# Patient Record
Sex: MALE | Race: WHITE | NOT HISPANIC OR LATINO | URBAN - METROPOLITAN AREA
[De-identification: names, ages, dates, MRNs, and addresses within clinical notes are randomized per-mention and may not be internally consistent; named-entity substitution may affect disease eponyms.]

---

## 2023-02-02 ENCOUNTER — OFFICE (OUTPATIENT)
Dept: URBAN - METROPOLITAN AREA CLINIC 26 | Facility: CLINIC | Age: 58
End: 2023-02-02

## 2023-02-02 VITALS
TEMPERATURE: 97.8 F | HEIGHT: 70 IN | SYSTOLIC BLOOD PRESSURE: 161 MMHG | HEART RATE: 73 BPM | WEIGHT: 257 LBS | DIASTOLIC BLOOD PRESSURE: 88 MMHG

## 2023-02-02 DIAGNOSIS — R10.813 RIGHT LOWER QUADRANT ABDOMINAL TENDERNESS: ICD-10-CM

## 2023-02-02 DIAGNOSIS — R10.814 LEFT LOWER QUADRANT ABDOMINAL TENDERNESS: ICD-10-CM

## 2023-02-02 DIAGNOSIS — R10.30 LOWER ABDOMINAL PAIN, UNSPECIFIED: ICD-10-CM

## 2023-02-02 DIAGNOSIS — K59.09 OTHER CONSTIPATION: ICD-10-CM

## 2023-02-02 PROCEDURE — 99213 OFFICE O/P EST LOW 20 MIN: CPT

## 2023-03-15 ENCOUNTER — OFFICE (OUTPATIENT)
Dept: URBAN - METROPOLITAN AREA CLINIC 26 | Facility: CLINIC | Age: 58
End: 2023-03-15
Payer: COMMERCIAL

## 2023-03-15 VITALS — HEIGHT: 70 IN | WEIGHT: 260 LBS

## 2023-03-15 DIAGNOSIS — Z87.19 PERSONAL HISTORY OF OTHER DISEASES OF THE DIGESTIVE SYSTEM: ICD-10-CM

## 2023-03-15 DIAGNOSIS — K59.09 OTHER CONSTIPATION: ICD-10-CM

## 2023-03-15 DIAGNOSIS — R14.0 ABDOMINAL DISTENSION (GASEOUS): ICD-10-CM

## 2023-03-15 DIAGNOSIS — I86.4 GASTRIC VARICES: ICD-10-CM

## 2023-03-15 DIAGNOSIS — R10.30 LOWER ABDOMINAL PAIN, UNSPECIFIED: ICD-10-CM

## 2023-03-15 DIAGNOSIS — I82.890 ACUTE EMBOLISM AND THROMBOSIS OF OTHER SPECIFIED VEINS: ICD-10-CM

## 2023-03-15 PROCEDURE — 99214 OFFICE O/P EST MOD 30 MIN: CPT | Performed by: CLINICAL NURSE SPECIALIST

## 2023-05-08 ENCOUNTER — AMBULATORY SURGICAL CENTER (OUTPATIENT)
Dept: URBAN - METROPOLITAN AREA SURGERY 12 | Facility: SURGERY | Age: 58
End: 2023-05-08
Payer: COMMERCIAL

## 2023-05-08 ENCOUNTER — OFFICE (OUTPATIENT)
Dept: URBAN - METROPOLITAN AREA PATHOLOGY 2 | Facility: PATHOLOGY | Age: 58
End: 2023-05-08
Payer: COMMERCIAL

## 2023-05-08 VITALS
SYSTOLIC BLOOD PRESSURE: 128 MMHG | DIASTOLIC BLOOD PRESSURE: 76 MMHG | SYSTOLIC BLOOD PRESSURE: 138 MMHG | RESPIRATION RATE: 10 BRPM | SYSTOLIC BLOOD PRESSURE: 138 MMHG | RESPIRATION RATE: 6 BRPM | OXYGEN SATURATION: 97 % | RESPIRATION RATE: 9 BRPM | DIASTOLIC BLOOD PRESSURE: 84 MMHG | RESPIRATION RATE: 7 BRPM | HEIGHT: 70 IN | HEART RATE: 64 BPM | RESPIRATION RATE: 10 BRPM | DIASTOLIC BLOOD PRESSURE: 76 MMHG | WEIGHT: 237 LBS | DIASTOLIC BLOOD PRESSURE: 83 MMHG | RESPIRATION RATE: 9 BRPM | SYSTOLIC BLOOD PRESSURE: 183 MMHG | HEART RATE: 67 BPM | RESPIRATION RATE: 9 BRPM | SYSTOLIC BLOOD PRESSURE: 124 MMHG | HEART RATE: 62 BPM | DIASTOLIC BLOOD PRESSURE: 81 MMHG | RESPIRATION RATE: 15 BRPM | SYSTOLIC BLOOD PRESSURE: 139 MMHG | HEART RATE: 70 BPM | RESPIRATION RATE: 12 BRPM | RESPIRATION RATE: 12 BRPM | DIASTOLIC BLOOD PRESSURE: 82 MMHG | RESPIRATION RATE: 12 BRPM | DIASTOLIC BLOOD PRESSURE: 84 MMHG | RESPIRATION RATE: 11 BRPM | OXYGEN SATURATION: 98 % | RESPIRATION RATE: 7 BRPM | SYSTOLIC BLOOD PRESSURE: 179 MMHG | HEIGHT: 70 IN | RESPIRATION RATE: 13 BRPM | RESPIRATION RATE: 6 BRPM | HEART RATE: 70 BPM | SYSTOLIC BLOOD PRESSURE: 179 MMHG | OXYGEN SATURATION: 94 % | DIASTOLIC BLOOD PRESSURE: 81 MMHG | HEART RATE: 68 BPM | DIASTOLIC BLOOD PRESSURE: 105 MMHG | HEART RATE: 64 BPM | OXYGEN SATURATION: 99 % | SYSTOLIC BLOOD PRESSURE: 179 MMHG | HEART RATE: 62 BPM | HEART RATE: 62 BPM | SYSTOLIC BLOOD PRESSURE: 138 MMHG | DIASTOLIC BLOOD PRESSURE: 95 MMHG | DIASTOLIC BLOOD PRESSURE: 83 MMHG | HEART RATE: 65 BPM | DIASTOLIC BLOOD PRESSURE: 95 MMHG | RESPIRATION RATE: 14 BRPM | OXYGEN SATURATION: 98 % | HEIGHT: 70 IN | HEART RATE: 63 BPM | DIASTOLIC BLOOD PRESSURE: 105 MMHG | SYSTOLIC BLOOD PRESSURE: 128 MMHG | DIASTOLIC BLOOD PRESSURE: 88 MMHG | OXYGEN SATURATION: 94 % | DIASTOLIC BLOOD PRESSURE: 83 MMHG | SYSTOLIC BLOOD PRESSURE: 145 MMHG | HEART RATE: 67 BPM | OXYGEN SATURATION: 99 % | HEART RATE: 68 BPM | SYSTOLIC BLOOD PRESSURE: 145 MMHG | RESPIRATION RATE: 15 BRPM | SYSTOLIC BLOOD PRESSURE: 136 MMHG | OXYGEN SATURATION: 100 % | HEART RATE: 59 BPM | WEIGHT: 237 LBS | RESPIRATION RATE: 6 BRPM | RESPIRATION RATE: 7 BRPM | HEART RATE: 65 BPM | OXYGEN SATURATION: 99 % | SYSTOLIC BLOOD PRESSURE: 128 MMHG | DIASTOLIC BLOOD PRESSURE: 76 MMHG | SYSTOLIC BLOOD PRESSURE: 136 MMHG | HEART RATE: 59 BPM | RESPIRATION RATE: 10 BRPM | HEART RATE: 66 BPM | OXYGEN SATURATION: 94 % | OXYGEN SATURATION: 97 % | HEART RATE: 68 BPM | HEART RATE: 65 BPM | SYSTOLIC BLOOD PRESSURE: 183 MMHG | DIASTOLIC BLOOD PRESSURE: 81 MMHG | HEART RATE: 63 BPM | OXYGEN SATURATION: 100 % | RESPIRATION RATE: 11 BRPM | SYSTOLIC BLOOD PRESSURE: 124 MMHG | HEART RATE: 66 BPM | OXYGEN SATURATION: 100 % | DIASTOLIC BLOOD PRESSURE: 88 MMHG | HEART RATE: 63 BPM | OXYGEN SATURATION: 97 % | HEART RATE: 64 BPM | RESPIRATION RATE: 14 BRPM | DIASTOLIC BLOOD PRESSURE: 82 MMHG | DIASTOLIC BLOOD PRESSURE: 88 MMHG | HEART RATE: 70 BPM | HEART RATE: 66 BPM | RESPIRATION RATE: 15 BRPM | WEIGHT: 237 LBS | SYSTOLIC BLOOD PRESSURE: 139 MMHG | SYSTOLIC BLOOD PRESSURE: 145 MMHG | RESPIRATION RATE: 11 BRPM | SYSTOLIC BLOOD PRESSURE: 139 MMHG | DIASTOLIC BLOOD PRESSURE: 82 MMHG | RESPIRATION RATE: 13 BRPM | SYSTOLIC BLOOD PRESSURE: 124 MMHG | HEART RATE: 67 BPM | DIASTOLIC BLOOD PRESSURE: 105 MMHG | HEART RATE: 59 BPM | DIASTOLIC BLOOD PRESSURE: 95 MMHG | RESPIRATION RATE: 14 BRPM | DIASTOLIC BLOOD PRESSURE: 84 MMHG | OXYGEN SATURATION: 98 % | SYSTOLIC BLOOD PRESSURE: 183 MMHG | RESPIRATION RATE: 13 BRPM | SYSTOLIC BLOOD PRESSURE: 136 MMHG

## 2023-05-08 DIAGNOSIS — K22.70 BARRETT'S ESOPHAGUS WITHOUT DYSPLASIA: ICD-10-CM

## 2023-05-08 DIAGNOSIS — T18.2XXA FOREIGN BODY IN STOMACH, INITIAL ENCOUNTER: ICD-10-CM

## 2023-05-08 DIAGNOSIS — I86.4 GASTRIC VARICES: ICD-10-CM

## 2023-05-08 DIAGNOSIS — K44.9 DIAPHRAGMATIC HERNIA WITHOUT OBSTRUCTION OR GANGRENE: ICD-10-CM

## 2023-05-08 PROBLEM — K31.89 OTHER DISEASES OF STOMACH AND DUODENUM: Status: ACTIVE | Noted: 2023-05-08

## 2023-05-08 PROBLEM — R10.84 GENERALIZED ABDOMINAL PAIN: Status: ACTIVE | Noted: 2023-05-08

## 2023-05-08 PROCEDURE — 88342 IMHCHEM/IMCYTCHM 1ST ANTB: CPT | Performed by: PATHOLOGY

## 2023-05-08 PROCEDURE — 43239 EGD BIOPSY SINGLE/MULTIPLE: CPT | Performed by: INTERNAL MEDICINE

## 2023-05-08 PROCEDURE — 88305 TISSUE EXAM BY PATHOLOGIST: CPT | Performed by: PATHOLOGY

## 2023-05-08 PROCEDURE — 88313 SPECIAL STAINS GROUP 2: CPT | Performed by: PATHOLOGY

## 2023-05-08 RX ORDER — SENNOSIDES 8.6 MG/1
TABLET ORAL
Qty: 360 | Refills: 3 | Status: ACTIVE

## 2023-08-25 ENCOUNTER — OFFICE (OUTPATIENT)
Dept: URBAN - METROPOLITAN AREA CLINIC 26 | Facility: CLINIC | Age: 58
End: 2023-08-25
Payer: COMMERCIAL

## 2023-08-25 VITALS — WEIGHT: 222 LBS | HEIGHT: 70 IN | TEMPERATURE: 98.3 F

## 2023-08-25 DIAGNOSIS — Z86.010 PERSONAL HISTORY OF COLONIC POLYPS: ICD-10-CM

## 2023-08-25 DIAGNOSIS — K59.09 OTHER CONSTIPATION: ICD-10-CM

## 2023-08-25 DIAGNOSIS — I86.4 GASTRIC VARICES: ICD-10-CM

## 2023-08-25 DIAGNOSIS — K21.9 GASTRO-ESOPHAGEAL REFLUX DISEASE WITHOUT ESOPHAGITIS: ICD-10-CM

## 2023-08-25 DIAGNOSIS — K22.70 BARRETT'S ESOPHAGUS WITHOUT DYSPLASIA: ICD-10-CM

## 2023-08-25 PROCEDURE — 99214 OFFICE O/P EST MOD 30 MIN: CPT | Performed by: INTERNAL MEDICINE

## 2023-09-29 LAB
ALANINE AMINOTRANSFERASE (SGPT) (U/L) IN SER/PLAS: 30 U/L (ref 10–52)
ALBUMIN (G/DL) IN SER/PLAS: 4.8 G/DL (ref 3.4–5)
ALKALINE PHOSPHATASE (U/L) IN SER/PLAS: 59 U/L (ref 33–120)
ANION GAP IN SER/PLAS: 14 MMOL/L (ref 10–20)
ASPARTATE AMINOTRANSFERASE (SGOT) (U/L) IN SER/PLAS: 31 U/L (ref 9–39)
BASOPHILS (10*3/UL) IN BLOOD BY AUTOMATED COUNT: 0.02 X10E9/L (ref 0–0.1)
BASOPHILS/100 LEUKOCYTES IN BLOOD BY AUTOMATED COUNT: 0.3 % (ref 0–2)
BILIRUBIN TOTAL (MG/DL) IN SER/PLAS: 0.5 MG/DL (ref 0–1.2)
C REACTIVE PROTEIN (MG/L) IN SER/PLAS: 0.34 MG/DL
CALCIUM (MG/DL) IN SER/PLAS: 9.3 MG/DL (ref 8.6–10.6)
CARBON DIOXIDE, TOTAL (MMOL/L) IN SER/PLAS: 28 MMOL/L (ref 21–32)
CHLORIDE (MMOL/L) IN SER/PLAS: 109 MMOL/L (ref 98–107)
CREATININE (MG/DL) IN SER/PLAS: 1.23 MG/DL (ref 0.5–1.3)
EOSINOPHILS (10*3/UL) IN BLOOD BY AUTOMATED COUNT: 0.18 X10E9/L (ref 0–0.7)
EOSINOPHILS/100 LEUKOCYTES IN BLOOD BY AUTOMATED COUNT: 3 % (ref 0–6)
ERYTHROCYTE DISTRIBUTION WIDTH (RATIO) BY AUTOMATED COUNT: 13.1 % (ref 11.5–14.5)
ERYTHROCYTE MEAN CORPUSCULAR HEMOGLOBIN CONCENTRATION (G/DL) BY AUTOMATED: 31.5 G/DL (ref 32–36)
ERYTHROCYTE MEAN CORPUSCULAR VOLUME (FL) BY AUTOMATED COUNT: 86 FL (ref 80–100)
ERYTHROCYTES (10*6/UL) IN BLOOD BY AUTOMATED COUNT: 4.96 X10E12/L (ref 4.5–5.9)
GFR MALE: 67 ML/MIN/1.73M2
GLUCOSE (MG/DL) IN SER/PLAS: 59 MG/DL (ref 74–99)
HEMATOCRIT (%) IN BLOOD BY AUTOMATED COUNT: 42.8 % (ref 41–52)
HEMOGLOBIN (G/DL) IN BLOOD: 13.5 G/DL (ref 13.5–17.5)
IMMATURE GRANULOCYTES/100 LEUKOCYTES IN BLOOD BY AUTOMATED COUNT: 0.2 % (ref 0–0.9)
LEUKOCYTES (10*3/UL) IN BLOOD BY AUTOMATED COUNT: 5.9 X10E9/L (ref 4.4–11.3)
LYMPHOCYTES (10*3/UL) IN BLOOD BY AUTOMATED COUNT: 1.42 X10E9/L (ref 1.2–4.8)
LYMPHOCYTES/100 LEUKOCYTES IN BLOOD BY AUTOMATED COUNT: 24 % (ref 13–44)
MONOCYTES (10*3/UL) IN BLOOD BY AUTOMATED COUNT: 0.47 X10E9/L (ref 0.1–1)
MONOCYTES/100 LEUKOCYTES IN BLOOD BY AUTOMATED COUNT: 7.9 % (ref 2–10)
NEUTROPHILS (10*3/UL) IN BLOOD BY AUTOMATED COUNT: 3.82 X10E9/L (ref 1.2–7.7)
NEUTROPHILS/100 LEUKOCYTES IN BLOOD BY AUTOMATED COUNT: 64.6 % (ref 40–80)
NRBC (PER 100 WBCS) BY AUTOMATED COUNT: 0 /100 WBC (ref 0–0)
PLATELETS (10*3/UL) IN BLOOD AUTOMATED COUNT: 221 X10E9/L (ref 150–450)
POTASSIUM (MMOL/L) IN SER/PLAS: 3.9 MMOL/L (ref 3.5–5.3)
PROTEIN TOTAL: 6.8 G/DL (ref 6.4–8.2)
SEDIMENTATION RATE, ERYTHROCYTE: 3 MM/H (ref 0–20)
SODIUM (MMOL/L) IN SER/PLAS: 147 MMOL/L (ref 136–145)
UREA NITROGEN (MG/DL) IN SER/PLAS: 21 MG/DL (ref 6–23)

## 2023-11-27 DIAGNOSIS — E55.9 VITAMIN D DEFICIENCY: Primary | ICD-10-CM

## 2023-11-27 PROBLEM — E78.2 HYPERLIPIDEMIA, MIXED: Status: ACTIVE | Noted: 2023-11-27

## 2023-11-27 PROBLEM — M15.9 GENERALIZED OSTEOARTHRITIS: Status: ACTIVE | Noted: 2023-11-27

## 2023-11-27 PROBLEM — E03.9 HYPOTHYROIDISM: Status: ACTIVE | Noted: 2023-11-27

## 2023-11-27 PROBLEM — Z96.41 INSULIN PUMP IN PLACE: Status: ACTIVE | Noted: 2023-11-27

## 2023-11-27 PROBLEM — M79.2 NEUROPATHIC PAIN: Status: ACTIVE | Noted: 2023-11-27

## 2023-11-27 PROBLEM — M19.042 LOCALIZED OSTEOARTHRITIS OF BOTH HANDS: Status: ACTIVE | Noted: 2023-11-27

## 2023-11-27 PROBLEM — M19.041 LOCALIZED OSTEOARTHRITIS OF BOTH HANDS: Status: ACTIVE | Noted: 2023-11-27

## 2023-11-27 PROBLEM — U09.9 POST-COVID-19 CONDITION: Status: ACTIVE | Noted: 2023-11-27

## 2023-11-27 PROBLEM — E10.9 TYPE 1 DIABETES MELLITUS (MULTI): Status: ACTIVE | Noted: 2023-11-27

## 2023-11-27 PROBLEM — R26.89 BALANCE PROBLEM: Status: ACTIVE | Noted: 2023-11-27

## 2023-11-27 PROBLEM — R41.89 BRAIN FOG: Status: ACTIVE | Noted: 2023-11-27

## 2023-11-27 PROBLEM — I10 BENIGN ESSENTIAL HYPERTENSION: Status: ACTIVE | Noted: 2023-11-27

## 2023-11-27 PROBLEM — M72.0 DUPUYTREN'S CONTRACTURE: Status: ACTIVE | Noted: 2023-11-27

## 2023-11-27 PROBLEM — I38 HEART VALVE DISEASE: Status: ACTIVE | Noted: 2023-11-27

## 2023-11-27 RX ORDER — MELOXICAM 15 MG/1
1 TABLET ORAL DAILY PRN
COMMUNITY
Start: 2022-09-28 | End: 2024-03-06 | Stop reason: SDUPTHER

## 2023-11-27 RX ORDER — ERGOCALCIFEROL 1.25 MG/1
1 CAPSULE ORAL
Qty: 12 CAPSULE | Refills: 3 | Status: SHIPPED | OUTPATIENT
Start: 2023-11-27 | End: 2024-11-26

## 2023-11-27 RX ORDER — ATORVASTATIN CALCIUM 80 MG/1
1 TABLET, FILM COATED ORAL NIGHTLY
COMMUNITY
Start: 2022-03-30

## 2023-11-27 RX ORDER — ALBUTEROL SULFATE 90 UG/1
AEROSOL, METERED RESPIRATORY (INHALATION)
COMMUNITY
Start: 2022-03-30

## 2023-11-27 RX ORDER — OMEPRAZOLE 20 MG/1
TABLET, ORALLY DISINTEGRATING, DELAYED RELEASE ORAL
COMMUNITY
Start: 2022-03-30 | End: 2024-03-06 | Stop reason: SDUPTHER

## 2023-11-27 RX ORDER — FENOFIBRATE 160 MG/1
1 TABLET ORAL DAILY
COMMUNITY
Start: 2022-03-30 | End: 2024-03-06 | Stop reason: SDUPTHER

## 2023-11-27 RX ORDER — INSULIN GLARGINE 100 [IU]/ML
INJECTION, SOLUTION SUBCUTANEOUS
COMMUNITY
Start: 2022-03-30 | End: 2024-03-06 | Stop reason: SDUPTHER

## 2023-11-27 RX ORDER — GUAIFENESIN 600 MG/1
600 TABLET, EXTENDED RELEASE ORAL 2 TIMES DAILY
COMMUNITY

## 2023-11-27 RX ORDER — ERGOCALCIFEROL 1.25 MG/1
1 CAPSULE ORAL
COMMUNITY
Start: 2020-05-14 | End: 2023-11-27 | Stop reason: SDUPTHER

## 2023-11-27 RX ORDER — LISINOPRIL 10 MG/1
1 TABLET ORAL DAILY
COMMUNITY
Start: 2021-02-05 | End: 2024-03-06 | Stop reason: SDUPTHER

## 2024-03-06 ENCOUNTER — TELEPHONE (OUTPATIENT)
Dept: PRIMARY CARE | Facility: CLINIC | Age: 59
End: 2024-03-06
Payer: MEDICAID

## 2024-03-06 DIAGNOSIS — M15.9 GENERALIZED OSTEOARTHRITIS: Primary | ICD-10-CM

## 2024-03-06 RX ORDER — LOSARTAN POTASSIUM 50 MG/1
50 TABLET ORAL DAILY
COMMUNITY
Start: 2024-02-21

## 2024-03-06 RX ORDER — OMEPRAZOLE 40 MG/1
40 CAPSULE, DELAYED RELEASE ORAL DAILY
COMMUNITY
Start: 2023-12-19

## 2024-03-06 RX ORDER — INSULIN GLARGINE 100 [IU]/ML
30 INJECTION, SOLUTION SUBCUTANEOUS 2 TIMES DAILY
COMMUNITY
Start: 2024-02-12

## 2024-03-06 RX ORDER — LISINOPRIL 20 MG/1
20 TABLET ORAL DAILY
COMMUNITY
Start: 2024-02-12

## 2024-03-06 RX ORDER — OMEGA-3 FATTY ACIDS 1000 MG
1000 CAPSULE ORAL 2 TIMES DAILY
COMMUNITY
Start: 2024-02-12

## 2024-03-06 RX ORDER — NABUMETONE 750 MG/1
750 TABLET, FILM COATED ORAL 2 TIMES DAILY PRN
COMMUNITY
Start: 2024-02-01 | End: 2024-03-06 | Stop reason: SDUPTHER

## 2024-03-06 RX ORDER — TIZANIDINE 4 MG/1
4 TABLET ORAL EVERY 8 HOURS PRN
COMMUNITY
Start: 2024-02-01

## 2024-03-06 RX ORDER — NABUMETONE 750 MG/1
750 TABLET, FILM COATED ORAL 2 TIMES DAILY PRN
Qty: 180 TABLET | Refills: 3 | Status: SHIPPED | OUTPATIENT
Start: 2024-03-06 | End: 2024-03-29 | Stop reason: SDUPTHER

## 2024-03-06 RX ORDER — AMITRIPTYLINE HYDROCHLORIDE 10 MG/1
10 TABLET, FILM COATED ORAL 2 TIMES DAILY
COMMUNITY
Start: 2024-02-12

## 2024-03-06 RX ORDER — INSULIN LISPRO 100 [IU]/ML
22 INJECTION, SOLUTION INTRAVENOUS; SUBCUTANEOUS
COMMUNITY
Start: 2024-02-12

## 2024-03-06 RX ORDER — DULAGLUTIDE 0.75 MG/.5ML
0.75 INJECTION, SOLUTION SUBCUTANEOUS
COMMUNITY
Start: 2024-02-21

## 2024-03-06 RX ORDER — GEMFIBROZIL 600 MG/1
600 TABLET, FILM COATED ORAL 2 TIMES DAILY
COMMUNITY
Start: 2024-02-12

## 2024-03-06 RX ORDER — GABAPENTIN 300 MG/1
300 CAPSULE ORAL NIGHTLY
COMMUNITY
Start: 2024-02-13

## 2024-03-06 NOTE — TELEPHONE ENCOUNTER
Pt called for a refill on nabumetona 750mg       [id number] - [place], [place] - [address]  [address] [facility] [address]  Phone: [phone] Fax: [phone]

## 2024-03-29 ENCOUNTER — OFFICE VISIT (OUTPATIENT)
Dept: RHEUMATOLOGY | Facility: CLINIC | Age: 59
End: 2024-03-29
Payer: MEDICAID

## 2024-03-29 VITALS
HEIGHT: 70 IN | OXYGEN SATURATION: 96 % | DIASTOLIC BLOOD PRESSURE: 94 MMHG | HEART RATE: 70 BPM | TEMPERATURE: 97.5 F | WEIGHT: 224.4 LBS | SYSTOLIC BLOOD PRESSURE: 150 MMHG | BODY MASS INDEX: 32.13 KG/M2

## 2024-03-29 DIAGNOSIS — M72.0 DUPUYTREN'S CONTRACTURE: ICD-10-CM

## 2024-03-29 DIAGNOSIS — M15.9 GENERALIZED OSTEOARTHRITIS: Primary | ICD-10-CM

## 2024-03-29 DIAGNOSIS — M51.26 HERNIATED INTERVERTEBRAL DISC OF LUMBAR SPINE: ICD-10-CM

## 2024-03-29 PROBLEM — U09.9 POST-COVID-19 CONDITION: Status: RESOLVED | Noted: 2023-11-27 | Resolved: 2024-03-29

## 2024-03-29 PROCEDURE — 3077F SYST BP >= 140 MM HG: CPT | Performed by: INTERNAL MEDICINE

## 2024-03-29 PROCEDURE — 4010F ACE/ARB THERAPY RXD/TAKEN: CPT | Performed by: INTERNAL MEDICINE

## 2024-03-29 PROCEDURE — 1036F TOBACCO NON-USER: CPT | Performed by: INTERNAL MEDICINE

## 2024-03-29 PROCEDURE — 3080F DIAST BP >= 90 MM HG: CPT | Performed by: INTERNAL MEDICINE

## 2024-03-29 PROCEDURE — 99214 OFFICE O/P EST MOD 30 MIN: CPT | Performed by: INTERNAL MEDICINE

## 2024-03-29 RX ORDER — SENNOSIDES 8.6 MG/1
TABLET ORAL
COMMUNITY
Start: 2023-05-08

## 2024-03-29 RX ORDER — PEN NEEDLE, DIABETIC 29 G X1/2"
NEEDLE, DISPOSABLE MISCELLANEOUS
COMMUNITY
Start: 2024-03-12

## 2024-03-29 RX ORDER — DULAGLUTIDE 3 MG/.5ML
3 INJECTION, SOLUTION SUBCUTANEOUS
COMMUNITY
Start: 2024-03-15

## 2024-03-29 RX ORDER — MULTIVIT-MIN/IRON/FOLIC ACID/K 18-600-40
500 CAPSULE ORAL
COMMUNITY

## 2024-03-29 RX ORDER — NABUMETONE 750 MG/1
750 TABLET, FILM COATED ORAL 2 TIMES DAILY PRN
Qty: 180 TABLET | Refills: 3 | Status: SHIPPED | OUTPATIENT
Start: 2024-03-29 | End: 2025-03-29

## 2024-03-29 RX ORDER — BLOOD-GLUCOSE SENSOR
EACH MISCELLANEOUS
COMMUNITY
Start: 2024-03-12

## 2024-03-29 ASSESSMENT — ENCOUNTER SYMPTOMS
JOINT SWELLING: 0
ARTHRALGIAS: 1
SHORTNESS OF BREATH: 0
MYALGIAS: 1
WEAKNESS: 0
FEVER: 0
NUMBNESS: 1
BACK PAIN: 1
COLOR CHANGE: 0
COUGH: 0
FATIGUE: 0

## 2024-03-29 ASSESSMENT — PATIENT HEALTH QUESTIONNAIRE - PHQ9
1. LITTLE INTEREST OR PLEASURE IN DOING THINGS: NOT AT ALL
SUM OF ALL RESPONSES TO PHQ9 QUESTIONS 1 AND 2: 0
2. FEELING DOWN, DEPRESSED OR HOPELESS: NOT AT ALL

## 2024-03-29 NOTE — PATIENT INSTRUCTIONS
It was a pleasure to see you today  Please call if your symptoms worsen  Please review your summary for education and reminders.  Follow up at your next appointment.    If you had labs/xrays done today, you will be able to view on [facility].   We will contact you when the results are reviewed for further discussion.  Please note that you may receive your results before I have had a chance to review.  Please know I will be contacting you for discussion  Homegoing instructions for all patient  A healthy lifestyle helps chronic diseases  These are all the goals you should strive to improve your overall health   Blood pressure <130/85   BMI of <30 or waist circumference that is 1/2 of your height   Fasting blood sugar <107 (if you are diabetic, aim for an A1c <6.4%_   LDL cholesterol <130   Avoid smoking   Manage your stress   Get your preventive exams   Get your immunizations

## 2024-03-29 NOTE — LETTER
March 29, 2024     Salma Rosa, APRN-CNP  [address] [place] [address]    Patient: Nagi Peña   YOB: 1965   Date of Visit: 3/29/2024       Dear Dr. Salma Rosa, APRN-CNP:    Thank you for referring Nagi Peña to me for evaluation. Below are my notes for this consultation.  If you have questions, please do not hesitate to call me. I look forward to following your patient along with you.       Sincerely,     Odalys Ren MD      CC: No Recipients  ______________________________________________________________________________________    RHEUMATOLOGY/PRIMARY CARE PROGRESS NOTE  Nagi Peañ 59 y.o. male  Chief Complaint   Patient presents with   • Osteoarthritis       SUBJECTIVE  Re:  arthritis  Pt reports relafen is helping somewhat.   (Pharmacy only dispensing 20tab at a time--I prescribed 90 day supply).  Since last seen, Had Dupuytren's contracture release in L hand and planned for R hand as well  Continues to have a lot of back pain.  Just had L4 injection that didn't help.   Thinks he may need more surgery      Patient Active Problem List    Diagnosis Date Noted   • Balance problem 11/27/2023   • Benign essential hypertension 11/27/2023   • Brain fog 11/27/2023   • Dupuytren's contracture 11/27/2023   • Generalized osteoarthritis 11/27/2023   • Heart valve disease 11/27/2023   • Hyperlipidemia, mixed 11/27/2023   • Hypothyroidism 11/27/2023   • Insulin pump in place 11/27/2023   • Localized osteoarthritis of both hands 11/27/2023   • Neuropathic pain 11/27/2023   • Type 1 diabetes mellitus (CMS/HCC) 11/27/2023   • Vitamin D deficiency 11/27/2023     Past Medical History:   Diagnosis Date   • Caffeine use    • Carpal tunnel syndrome     History of carpal tunnel syndrome   • History of colonic diverticulitis     History of diverticulitis of colon   • Personal history of pneumonia (recurrent)     History of pneumonia   • Post-COVID-19 condition 11/27/2023   • SBO (small  "bowel obstruction) (CMS/[facility])     History of small bowel obstruction     Current Outpatient Medications   Medication Instructions   • albuterol 90 mcg/actuation inhaler inhalation   • amitriptyline (ELAVIL) 10 mg, oral, 2 times daily   • ascorbic acid (vitamin C) 500 mg, oral, Daily RT   • atorvastatin (Lipitor) 80 mg tablet 1 tablet, oral, Nightly   • BD Insulin Syringe Ultra-Fine 0.5 mL 31 gauge x 5/16\" syringe use 1 syringe 5 times a day.   • Dexcom G7 Sensor device use as directed   • ergocalciferol (VITAMIN D-2) 1,250 mcg, oral, Weekly   • gabapentin (NEURONTIN) 300 mg, oral, Nightly   • gemfibrozil (LOPID) 600 mg, oral, 2 times daily   • glucagon (GLUCAGEN) 1 mg, intravenous   • guaiFENesin (MUCINEX) 600 mg, oral, 2 times daily   • HumaLOG U-100 Insulin 22 Units, subcutaneous, 3 times daily with meals   • Lantus U-100 Insulin 30 Units, subcutaneous, 2 times daily   • lisinopril 20 mg, oral, Daily   • losartan (COZAAR) 50 mg, oral, Daily   • nabumetone (RELAFEN) 750 mg, oral, 2 times daily PRN   • omega-3 1,000 mg, oral, 2 times daily   • omeprazole (PRILOSEC) 40 mg, oral, Daily   • sennosides (Senokot) 8.6 mg tablet TAKE up to FOUR TABLETS BY MOUTH EVERY EVENING AS DIRECTED   • tiZANidine (ZANAFLEX) 4 mg, oral, Every 8 hours PRN   • Trulicity 0.75 mg, subcutaneous, Weekly   • Trulicity 3 mg, subcutaneous, Weekly     Allergies   Allergen Reactions   • Carmelo-1 Other     Passed out per pt-states this happened in ER with stitches and the use of NOVOCAINE   Patient states passed out, states okay with lidocaine and xylocaine   • Doxycycline Swelling     Skin sensitivity     Skin sensitivity   • Procaine Unknown     Review of Systems   Constitutional:  Negative for fatigue and fever.   Respiratory:  Negative for cough and shortness of breath.    Cardiovascular:  Negative for leg swelling.   Musculoskeletal:  Positive for arthralgias, back pain and myalgias. Negative for gait problem and joint swelling.   Skin:  " "Negative for color change and rash.   Neurological:  Positive for numbness. Negative for weakness.       PHYSICAL EXAM  BP (!) 150/94   Pulse 70   Temp 36.4 °C (97.5 °F)   Ht 1.778 m (5' 10\")   Wt 102 kg (224 lb 6.4 oz)   SpO2 96%   BMI 32.20 kg/m²   Physical Exam  Vitals reviewed.   Constitutional:       General: He is not in acute distress.     Appearance: Normal appearance.   HENT:      Head: Normocephalic and atraumatic.   Eyes:      Extraocular Movements: Extraocular movements intact.      Conjunctiva/sclera: Conjunctivae normal.      Pupils: Pupils are equal, round, and reactive to light.   Pulmonary:      Effort: Pulmonary effort is normal. No respiratory distress.   Musculoskeletal:         General: No swelling, tenderness or deformity. Normal range of motion.      Cervical back: Normal range of motion and neck supple.      Right lower leg: No edema.      Left lower leg: No edema.      Comments: Healing surgery L palm  R palm with Dupuytren's contracture isolated to thumb/index  No synovitis noted on exam   Skin:     Findings: No bruising or rash.   Neurological:      General: No focal deficit present.      Mental Status: He is alert and oriented to person, place, and time. Mental status is at baseline.      Gait: Gait normal.   Psychiatric:         Mood and Affect: Mood normal.         Assessment/plan  Problem List Items Addressed This Visit       Dupuytren's contracture    Current Assessment & Plan     S/p surgery in February         Generalized osteoarthritis - Primary     Follow up: _____months      "

## 2024-03-29 NOTE — PROGRESS NOTES
"RHEUMATOLOGY/PRIMARY CARE PROGRESS NOTE  Nagi Peña 59 y.o. male  Chief Complaint   Patient presents with    Osteoarthritis       SUBJECTIVE  Re:  arthritis  Pt reports relafen is helping somewhat.   (Pharmacy only dispensing 20tab at a time--I prescribed 90 day supply).  Since last seen, Had Dupuytren's contracture release in L hand and planned for R hand as well  Continues to have a lot of back pain.  Just had L4 injection that didn't help.   Thinks he may need more surgery      Patient Active Problem List    Diagnosis Date Noted    Balance problem 11/27/2023    Benign essential hypertension 11/27/2023    Brain fog 11/27/2023    Dupuytren's contracture 11/27/2023    Generalized osteoarthritis 11/27/2023    Heart valve disease 11/27/2023    Hyperlipidemia, mixed 11/27/2023    Hypothyroidism 11/27/2023    Insulin pump in place 11/27/2023    Localized osteoarthritis of both hands 11/27/2023    Neuropathic pain 11/27/2023    Type 1 diabetes mellitus (CMS/HCC) 11/27/2023    Vitamin D deficiency 11/27/2023     Past Medical History:   Diagnosis Date    Caffeine use     Carpal tunnel syndrome     History of carpal tunnel syndrome    History of colonic diverticulitis     History of diverticulitis of colon    Personal history of pneumonia (recurrent)     History of pneumonia    Post-COVID-19 condition 11/27/2023    SBO (small bowel obstruction) (CMS/HCC)     History of small bowel obstruction     Current Outpatient Medications   Medication Instructions    albuterol 90 mcg/actuation inhaler inhalation    amitriptyline (ELAVIL) 10 mg, oral, 2 times daily    ascorbic acid (vitamin C) 500 mg, oral, Daily RT    atorvastatin (Lipitor) 80 mg tablet 1 tablet, oral, Nightly    BD Insulin Syringe Ultra-Fine 0.5 mL 31 gauge x 5/16\" syringe use 1 syringe 5 times a day.    Dexcom G7 Sensor device use as directed    ergocalciferol (VITAMIN D-2) 1,250 mcg, oral, Weekly    gabapentin (NEURONTIN) 300 mg, oral, Nightly    gemfibrozil " "(LOPID) 600 mg, oral, 2 times daily    glucagon (GLUCAGEN) 1 mg, intravenous    guaiFENesin (MUCINEX) 600 mg, oral, 2 times daily    HumaLOG U-100 Insulin 22 Units, subcutaneous, 3 times daily with meals    Lantus U-100 Insulin 30 Units, subcutaneous, 2 times daily    lisinopril 20 mg, oral, Daily    losartan (COZAAR) 50 mg, oral, Daily    nabumetone (RELAFEN) 750 mg, oral, 2 times daily PRN    omega-3 1,000 mg, oral, 2 times daily    omeprazole (PRILOSEC) 40 mg, oral, Daily    sennosides (Senokot) 8.6 mg tablet TAKE up to FOUR TABLETS BY MOUTH EVERY EVENING AS DIRECTED    tiZANidine (ZANAFLEX) 4 mg, oral, Every 8 hours PRN    Trulicity 0.75 mg, subcutaneous, Weekly    Trulicity 3 mg, subcutaneous, Weekly     Allergies   Allergen Reactions    Carmelo-1 Other     Passed out per pt-states this happened in ER with stitches and the use of NOVOCAINE   Patient states passed out, states okay with lidocaine and xylocaine    Doxycycline Swelling     Skin sensitivity     Skin sensitivity    Procaine Unknown     Review of Systems   Constitutional:  Negative for fatigue and fever.   Respiratory:  Negative for cough and shortness of breath.    Cardiovascular:  Negative for leg swelling.   Musculoskeletal:  Positive for arthralgias, back pain and myalgias. Negative for gait problem and joint swelling.   Skin:  Negative for color change and rash.   Neurological:  Positive for numbness. Negative for weakness.       PHYSICAL EXAM  BP (!) 150/94   Pulse 70   Temp 36.4 °C (97.5 °F)   Ht 1.778 m (5' 10\")   Wt 102 kg (224 lb 6.4 oz)   SpO2 96%   BMI 32.20 kg/m²   Physical Exam  Vitals reviewed.   Constitutional:       General: He is not in acute distress.     Appearance: Normal appearance.   HENT:      Head: Normocephalic and atraumatic.   Eyes:      Extraocular Movements: Extraocular movements intact.      Conjunctiva/sclera: Conjunctivae normal.      Pupils: Pupils are equal, round, and reactive to light.   Pulmonary:      Effort: " Pulmonary effort is normal. No respiratory distress.   Musculoskeletal:         General: No swelling, tenderness or deformity. Normal range of motion.      Cervical back: Normal range of motion and neck supple.      Right lower leg: No edema.      Left lower leg: No edema.      Comments: Healing surgery L palm  R palm with Dupuytren's contracture isolated to thumb/index  No synovitis noted on exam   Skin:     Findings: No bruising or rash.   Neurological:      General: No focal deficit present.      Mental Status: He is alert and oriented to person, place, and time. Mental status is at baseline.      Gait: Gait normal.   Psychiatric:         Mood and Affect: Mood normal.         Assessment/plan  Problem List Items Addressed This Visit       Dupuytren's contracture    Current Assessment & Plan     S/p surgery in February         Generalized osteoarthritis - Primary     Follow up: _____months

## 2024-04-29 ENCOUNTER — OFFICE (OUTPATIENT)
Dept: URBAN - METROPOLITAN AREA CLINIC 26 | Facility: CLINIC | Age: 59
End: 2024-04-29
Payer: MEDICAID

## 2024-04-29 VITALS
HEART RATE: 69 BPM | DIASTOLIC BLOOD PRESSURE: 83 MMHG | HEIGHT: 70 IN | DIASTOLIC BLOOD PRESSURE: 84 MMHG | SYSTOLIC BLOOD PRESSURE: 139 MMHG | TEMPERATURE: 98.7 F | WEIGHT: 225 LBS | SYSTOLIC BLOOD PRESSURE: 150 MMHG

## 2024-04-29 DIAGNOSIS — K22.70 BARRETT'S ESOPHAGUS WITHOUT DYSPLASIA: ICD-10-CM

## 2024-04-29 DIAGNOSIS — K21.9 GASTRO-ESOPHAGEAL REFLUX DISEASE WITHOUT ESOPHAGITIS: ICD-10-CM

## 2024-04-29 DIAGNOSIS — K59.09 OTHER CONSTIPATION: ICD-10-CM

## 2024-04-29 DIAGNOSIS — I86.4 GASTRIC VARICES: ICD-10-CM

## 2024-04-29 DIAGNOSIS — Z09 ENCOUNTER FOR FOLLOW-UP EXAMINATION AFTER COMPLETED TREATMEN: ICD-10-CM

## 2024-04-29 DIAGNOSIS — Z86.010 PERSONAL HISTORY OF COLONIC POLYPS: ICD-10-CM

## 2024-04-29 PROCEDURE — 99214 OFFICE O/P EST MOD 30 MIN: CPT | Performed by: INTERNAL MEDICINE

## 2024-10-03 PROBLEM — E11.65 TYPE 2 DIABETES MELLITUS WITH HYPERGLYCEMIA (MULTI): Status: ACTIVE | Noted: 2024-10-03

## 2024-10-04 ENCOUNTER — APPOINTMENT (OUTPATIENT)
Dept: RHEUMATOLOGY | Facility: CLINIC | Age: 59
End: 2024-10-04
Payer: MEDICAID

## 2024-10-04 VITALS
TEMPERATURE: 97.2 F | HEIGHT: 70 IN | SYSTOLIC BLOOD PRESSURE: 160 MMHG | BODY MASS INDEX: 34.89 KG/M2 | HEART RATE: 62 BPM | DIASTOLIC BLOOD PRESSURE: 91 MMHG | WEIGHT: 243.7 LBS | OXYGEN SATURATION: 97 %

## 2024-10-04 DIAGNOSIS — E55.9 VITAMIN D DEFICIENCY: ICD-10-CM

## 2024-10-04 DIAGNOSIS — Z79.1 NSAID LONG-TERM USE: ICD-10-CM

## 2024-10-04 DIAGNOSIS — M15.9 GENERALIZED OSTEOARTHRITIS: Primary | ICD-10-CM

## 2024-10-04 PROCEDURE — 3008F BODY MASS INDEX DOCD: CPT | Performed by: INTERNAL MEDICINE

## 2024-10-04 PROCEDURE — 1036F TOBACCO NON-USER: CPT | Performed by: INTERNAL MEDICINE

## 2024-10-04 PROCEDURE — 4010F ACE/ARB THERAPY RXD/TAKEN: CPT | Performed by: INTERNAL MEDICINE

## 2024-10-04 PROCEDURE — 99213 OFFICE O/P EST LOW 20 MIN: CPT | Performed by: INTERNAL MEDICINE

## 2024-10-04 PROCEDURE — 3080F DIAST BP >= 90 MM HG: CPT | Performed by: INTERNAL MEDICINE

## 2024-10-04 PROCEDURE — 3077F SYST BP >= 140 MM HG: CPT | Performed by: INTERNAL MEDICINE

## 2024-10-04 RX ORDER — GLUCAGON INJECTION, SOLUTION 1 MG/.2ML
INJECTION, SOLUTION SUBCUTANEOUS
COMMUNITY
Start: 2024-08-13

## 2024-10-04 RX ORDER — SEMAGLUTIDE 0.68 MG/ML
INJECTION, SOLUTION SUBCUTANEOUS
COMMUNITY
Start: 2024-09-07

## 2024-10-04 RX ORDER — FLUTICASONE FUROATE, UMECLIDINIUM BROMIDE AND VILANTEROL TRIFENATATE 100; 62.5; 25 UG/1; UG/1; UG/1
1 POWDER RESPIRATORY (INHALATION) DAILY
COMMUNITY
Start: 2024-06-22

## 2024-10-04 RX ORDER — DICLOFENAC SODIUM 75 MG/1
75 TABLET, DELAYED RELEASE ORAL 2 TIMES DAILY
Qty: 180 TABLET | Refills: 3 | Status: SHIPPED | OUTPATIENT
Start: 2024-10-04 | End: 2025-10-04

## 2024-10-04 ASSESSMENT — ENCOUNTER SYMPTOMS
BACK PAIN: 1
JOINT SWELLING: 0
FATIGUE: 0
NUMBNESS: 0
MYALGIAS: 1
ARTHRALGIAS: 1
COLOR CHANGE: 0
WEAKNESS: 0
FEVER: 0
SHORTNESS OF BREATH: 0
COUGH: 0

## 2024-10-04 ASSESSMENT — PATIENT HEALTH QUESTIONNAIRE - PHQ9
2. FEELING DOWN, DEPRESSED OR HOPELESS: NOT AT ALL
SUM OF ALL RESPONSES TO PHQ9 QUESTIONS 1 & 2: 0
1. LITTLE INTEREST OR PLEASURE IN DOING THINGS: NOT AT ALL

## 2024-10-04 NOTE — PROGRESS NOTES
[facility] RHEUMATOLOGY     AND INTERNAL MEDICINE    RHEUMATOLOGY PROGRESS NOTE  Nagi Peña 59 y.o. male  Chief Complaint   Patient presents with    Osteoarthritis       SUBJECTIVE  Pt noting worsening pain.   Pt doesn't think the NSAID is helping.   Also going to be getting a stimulator placed for his back pain.  Pt states he doesn't think his vitamin D improved with replacement but lab hasn't been checked in a year.      Records since last seen reviewed in [facility], [facility] and [facility] Record  Patient Active Problem List    Diagnosis Date Noted    NSAID long-term use 10/04/2024    Type 2 diabetes mellitus with hyperglycemia (Multi) 10/03/2024    Herniated intervertebral disc of lumbar spine 03/29/2024    Balance problem 11/27/2023    Benign essential hypertension 11/27/2023    Brain fog 11/27/2023    Dupuytren's contracture 11/27/2023    Generalized osteoarthritis 11/27/2023    Heart valve disease 11/27/2023    Hyperlipidemia, mixed 11/27/2023    Hypothyroidism 11/27/2023    Insulin pump in place 11/27/2023    Localized osteoarthritis of both hands 11/27/2023    Neuropathic pain 11/27/2023    Vitamin D deficiency 11/27/2023     Past Medical History:   Diagnosis Date    Caffeine use     Carpal tunnel syndrome     History of carpal tunnel syndrome    COVID-19 vaccine series declined     History of colonic diverticulitis     History of diverticulitis of colon    Personal history of pneumonia (recurrent)     History of pneumonia    Post-COVID-19 condition 11/27/2023    SBO (small bowel obstruction) (Multi)     History of small bowel obstruction     Current Outpatient Medications on File Prior to Visit   Medication Sig Dispense Refill    albuterol 90 mcg/actuation inhaler Inhale.      amitriptyline (Elavil) 10 mg tablet Take 1 tablet (10 mg) by mouth 2 times a day.      ascorbic acid, vitamin C, 500 mg capsule Take 500 mg by mouth once daily.       "atorvastatin (Lipitor) 80 mg tablet Take 1 tablet (80 mg) by mouth once daily at bedtime.      BD Insulin Syringe Ultra-Fine 0.5 mL 31 gauge x 5/16\" syringe use 1 syringe 5 times a day.      Dexcom G7 Sensor device use as directed      ergocalciferol (Vitamin D-2) 1.25 MG (31589 UT) capsule Take 1 capsule (1,250 mcg) by mouth 1 (one) time per week. 12 capsule 3    gabapentin (Neurontin) 300 mg capsule Take 1 capsule (300 mg) by mouth once daily at bedtime.      gemfibrozil (Lopid) 600 mg tablet Take 1 tablet (600 mg) by mouth 2 times a day.      guaiFENesin (Mucinex) 600 mg 12 hr tablet Take 1 tablet (600 mg) by mouth 2 times a day.      Gvoke HypoPen 2-Pack 1 mg/0.2 mL auto-injector Inject 0.2 mL (1 mg) under the skin Once as needed for low blood sugar.      HumaLOG U-100 Insulin 100 unit/mL injection Inject 22 Units under the skin 3 times daily (morning, midday, late afternoon).      Lantus U-100 Insulin 100 unit/mL injection Inject 30 Units under the skin 2 times a day.      lisinopril 20 mg tablet Take 1 tablet (20 mg) by mouth once daily.      losartan (Cozaar) 50 mg tablet Take 1 tablet (50 mg) by mouth once daily.      omega-3 1,000 mg capsule capsule Take 1 capsule (1,000 mg) by mouth 2 times a day.      omeprazole (PriLOSEC) 40 mg DR capsule Take 1 capsule (40 mg) by mouth once daily.      Ozempic 0.25 mg or 0.5 mg (2 mg/3 mL) pen injector every 7 days.      sennosides (Senokot) 8.6 mg tablet TAKE up to FOUR TABLETS BY MOUTH EVERY EVENING AS DIRECTED      tiZANidine (Zanaflex) 4 mg tablet Take 1 tablet (4 mg) by mouth every 8 hours if needed.      Trelegy Ellipta 100-62.5-25 mcg blister with device Inhale 1 puff once daily. as instructed      [DISCONTINUED] nabumetone (Relafen) 750 mg tablet Take 1 tablet (750 mg) by mouth 2 times a day as needed for moderate pain (4 - 6). 180 tablet 3    [DISCONTINUED] glucagon (Glucagen) 1 mg injection Infuse 1 mg into a venous catheter.      [DISCONTINUED] Trulicity 0.75 " "mg/0.5 mL pen injector Inject 0.75 mg under the skin 1 (one) time per week.      [DISCONTINUED] Trulicity 3 mg/0.5 mL pen injector Inject 3 mg under the skin 1 (one) time per week.       No current facility-administered medications on file prior to visit.     Allergies   Allergen Reactions    Pollen Extracts Runny nose    Carmelo-1 Other     Passed out per pt-states this happened in ER with stitches and the use of NOVOCAINE   Patient states passed out, states okay with lidocaine and xylocaine    Doxycycline Swelling     Skin sensitivity     Skin sensitivity    Procaine Unknown     Social History     Tobacco Use    Smoking status: Never    Smokeless tobacco: Never   Vaping Use    Vaping status: Never Used   Substance Use Topics    Alcohol use: Yes     Comment: occasional    Drug use: Never     Review of Systems   Constitutional:  Negative for fatigue and fever.   Respiratory:  Negative for cough and shortness of breath.    Cardiovascular:  Negative for leg swelling.   Musculoskeletal:  Positive for arthralgias, back pain and myalgias. Negative for gait problem and joint swelling.   Skin:  Negative for color change and rash.   Neurological:  Negative for weakness and numbness.       PHYSICAL EXAM  BP (!) 160/91   Pulse 62   Temp 36.2 °C (97.2 °F) (Temporal)   Ht 1.778 m (5' 10\")   Wt 111 kg (243 lb 11.2 oz)   SpO2 97%   BMI 34.97 kg/m²   Depression: Not at risk (10/4/2024)    PHQ-2     PHQ-2 Score: 0     Physical Exam  Vitals reviewed.   Constitutional:       General: He is not in acute distress.     Appearance: Normal appearance.   HENT:      Head: Normocephalic and atraumatic.   Eyes:      Extraocular Movements: Extraocular movements intact.      Conjunctiva/sclera: Conjunctivae normal.      Pupils: Pupils are equal, round, and reactive to light.   Pulmonary:      Effort: Pulmonary effort is normal. No respiratory distress.   Musculoskeletal:         General: No swelling, tenderness or deformity. Normal range of " motion.      Cervical back: Normal range of motion and neck supple.      Right lower leg: No edema.      Left lower leg: No edema.      Comments: R palm with Dupuytren's contracture isolated to thumb/index  No synovitis noted on exam   Skin:     Findings: No bruising or rash.   Neurological:      General: No focal deficit present.      Mental Status: He is alert and oriented to person, place, and time. Mental status is at baseline.      Gait: Gait normal.   Psychiatric:         Mood and Affect: Mood normal.       Health Maintenance Due   Topic Date Due    Yearly Adult Physical  Never done    Diabetes: Retinopathy Screening  Never done    Diabetes: Urine Protein Screening  Never done    Zoster Vaccines (1 of 2) Never done    Diabetes: Hemoglobin A1C  09/17/2022    RSV Pregnant patients and/or  patients aged 60+ years (1 - 1-dose 60+ series) Never done    Lipid Panel  08/26/2024    Influenza Vaccine (1) Never done       Assessment/plan  Assessment & Plan  Generalized osteoarthritis  Will change NSAID to see if he has improved relief of symptoms  Continue to see pain management regarding back pain  Orders:    Comprehensive Metabolic Panel; Future    diclofenac (Voltaren) 75 mg EC tablet; Take 1 tablet (75 mg) by mouth 2 times a day. Do not crush, chew, or split.    NSAID long-term use    Orders:    Comprehensive Metabolic Panel; Future    Vitamin D deficiency  Labs ordered  Orders:    Vitamin D 25-Hydroxy,Total (for eval of Vitamin D levels); Future      Follow up: ___6__months (of note, current insurance is [employer] based and this visit is out of network.  Pt to decide if he will continue to see me as self pay)    Immunization History   Administered Date(s) Administered    Tdap vaccine, age 7 year and older (BOOSTRIX, ADACEL) 01/01/2016

## 2024-10-04 NOTE — PATIENT INSTRUCTIONS
It was a pleasure to see you today  Please call if your symptoms worsen  Please review your summary for education and reminders.  Follow up at your next appointment.    If you had labs/xrays done today, you will be able to view on [facility].   We will contact you when the results are reviewed for further discussion.  Please note that you may receive your results before I have had a chance to review.  Please know I will be contacting you for discussion  Homegoing instructions for all patient  A healthy lifestyle helps chronic diseases  These are all the goals you should strive to improve your overall health   Blood pressure <130/85   BMI of <30 or waist circumference that is 1/2 of your height   Fasting blood sugar <107 (if you are diabetic, aim for an A1c <6.4%_   LDL cholesterol <130   Avoid smoking   Manage your stress   Get your preventive exams   Get your immunizations   You are on an anti-inflammatory medication.  Always make sure you take this medication with food.   You increase your risk of an ulcer if not taken correctly.  Recent studies have shown there is an increased risk of heart attacks and stroke with chronic use.  Discontinue and see your doctor if you have any suspicious symptoms.    If possible, only take this medication on an as needed basis

## 2024-10-04 NOTE — ASSESSMENT & PLAN NOTE
Will change NSAID to see if he has improved relief of symptoms  Continue to see pain management regarding back pain  Orders:    Comprehensive Metabolic Panel; Future    diclofenac (Voltaren) 75 mg EC tablet; Take 1 tablet (75 mg) by mouth 2 times a day. Do not crush, chew, or split.

## 2024-10-04 NOTE — LETTER
October 4, 2024     Salma Rosa, APRN-CNP  [address] [place] [address]    Patient: Nagi Peña   YOB: 1965   Date of Visit: 10/4/2024       Dear Dr. Salma Rosa, APRN-CNP:    Thank you for referring Naig Peña to me for evaluation. Below are my notes for this visit.  If you have questions, please do not hesitate to call me. I look forward to following your patient along with you.       Sincerely,     Odalys Ren MD      CC: No Recipients  ______________________________________________________________________________________                                                    [facility] RHEUMATOLOGY     AND INTERNAL MEDICINE    RHEUMATOLOGY PROGRESS NOTE  Nagi Peña 59 y.o. male  Chief Complaint   Patient presents with   • Osteoarthritis       SUBJECTIVE  Pt noting worsening pain.   Pt doesn't think the NSAID is helping.   Also going to be getting a stimulator placed for his back pain.  Pt states he doesn't think his vitamin D improved with replacement but lab hasn't been checked in a year.      Records since last seen reviewed in [facility], [facility] and [facility] Record  Patient Active Problem List    Diagnosis Date Noted   • NSAID long-term use 10/04/2024   • Type 2 diabetes mellitus with hyperglycemia (Multi) 10/03/2024   • Herniated intervertebral disc of lumbar spine 03/29/2024   • Balance problem 11/27/2023   • Benign essential hypertension 11/27/2023   • Brain fog 11/27/2023   • Dupuytren's contracture 11/27/2023   • Generalized osteoarthritis 11/27/2023   • Heart valve disease 11/27/2023   • Hyperlipidemia, mixed 11/27/2023   • Hypothyroidism 11/27/2023   • Insulin pump in place 11/27/2023   • Localized osteoarthritis of both hands 11/27/2023   • Neuropathic pain 11/27/2023   • Vitamin D deficiency 11/27/2023     Past Medical History:   Diagnosis Date   • Caffeine use    • Carpal tunnel syndrome     History of carpal tunnel syndrome   • COVID-19 vaccine  "series declined    • History of colonic diverticulitis     History of diverticulitis of colon   • Personal history of pneumonia (recurrent)     History of pneumonia   • Post-COVID-19 condition 11/27/2023   • SBO (small bowel obstruction) (Multi)     History of small bowel obstruction     Current Outpatient Medications on File Prior to Visit   Medication Sig Dispense Refill   • albuterol 90 mcg/actuation inhaler Inhale.     • amitriptyline (Elavil) 10 mg tablet Take 1 tablet (10 mg) by mouth 2 times a day.     • ascorbic acid, vitamin C, 500 mg capsule Take 500 mg by mouth once daily.     • atorvastatin (Lipitor) 80 mg tablet Take 1 tablet (80 mg) by mouth once daily at bedtime.     • BD Insulin Syringe Ultra-Fine 0.5 mL 31 gauge x 5/16\" syringe use 1 syringe 5 times a day.     • Dexcom G7 Sensor device use as directed     • ergocalciferol (Vitamin D-2) 1.25 MG (58402 UT) capsule Take 1 capsule (1,250 mcg) by mouth 1 (one) time per week. 12 capsule 3   • gabapentin (Neurontin) 300 mg capsule Take 1 capsule (300 mg) by mouth once daily at bedtime.     • gemfibrozil (Lopid) 600 mg tablet Take 1 tablet (600 mg) by mouth 2 times a day.     • guaiFENesin (Mucinex) 600 mg 12 hr tablet Take 1 tablet (600 mg) by mouth 2 times a day.     • Gvoke HypoPen 2-Pack 1 mg/0.2 mL auto-injector Inject 0.2 mL (1 mg) under the skin Once as needed for low blood sugar.     • HumaLOG U-100 Insulin 100 unit/mL injection Inject 22 Units under the skin 3 times daily (morning, midday, late afternoon).     • Lantus U-100 Insulin 100 unit/mL injection Inject 30 Units under the skin 2 times a day.     • lisinopril 20 mg tablet Take 1 tablet (20 mg) by mouth once daily.     • losartan (Cozaar) 50 mg tablet Take 1 tablet (50 mg) by mouth once daily.     • omega-3 1,000 mg capsule capsule Take 1 capsule (1,000 mg) by mouth 2 times a day.     • omeprazole (PriLOSEC) 40 mg DR capsule Take 1 capsule (40 mg) by mouth once daily.     • Ozempic 0.25 mg or " "0.5 mg (2 mg/3 mL) pen injector every 7 days.     • sennosides (Senokot) 8.6 mg tablet TAKE up to FOUR TABLETS BY MOUTH EVERY EVENING AS DIRECTED     • tiZANidine (Zanaflex) 4 mg tablet Take 1 tablet (4 mg) by mouth every 8 hours if needed.     • Trelegy Ellipta 100-62.5-25 mcg blister with device Inhale 1 puff once daily. as instructed     • [DISCONTINUED] nabumetone (Relafen) 750 mg tablet Take 1 tablet (750 mg) by mouth 2 times a day as needed for moderate pain (4 - 6). 180 tablet 3   • [DISCONTINUED] glucagon (Glucagen) 1 mg injection Infuse 1 mg into a venous catheter.     • [DISCONTINUED] Trulicity 0.75 mg/0.5 mL pen injector Inject 0.75 mg under the skin 1 (one) time per week.     • [DISCONTINUED] Trulicity 3 mg/0.5 mL pen injector Inject 3 mg under the skin 1 (one) time per week.       No current facility-administered medications on file prior to visit.     Allergies   Allergen Reactions   • Pollen Extracts Runny nose   • Carmelo-1 Other     Passed out per pt-states this happened in ER with stitches and the use of NOVOCAINE   Patient states passed out, states okay with lidocaine and xylocaine   • Doxycycline Swelling     Skin sensitivity     Skin sensitivity   • Procaine Unknown     Social History     Tobacco Use   • Smoking status: Never   • Smokeless tobacco: Never   Vaping Use   • Vaping status: Never Used   Substance Use Topics   • Alcohol use: Yes     Comment: occasional   • Drug use: Never     Review of Systems   Constitutional:  Negative for fatigue and fever.   Respiratory:  Negative for cough and shortness of breath.    Cardiovascular:  Negative for leg swelling.   Musculoskeletal:  Positive for arthralgias, back pain and myalgias. Negative for gait problem and joint swelling.   Skin:  Negative for color change and rash.   Neurological:  Negative for weakness and numbness.       PHYSICAL EXAM  BP (!) 160/91   Pulse 62   Temp 36.2 °C (97.2 °F) (Temporal)   Ht 1.778 m (5' 10\")   Wt 111 kg (243 lb " 11.2 oz)   SpO2 97%   BMI 34.97 kg/m²   Depression: Not at risk (10/4/2024)    PHQ-2    • PHQ-2 Score: 0     Physical Exam  Vitals reviewed.   Constitutional:       General: He is not in acute distress.     Appearance: Normal appearance.   HENT:      Head: Normocephalic and atraumatic.   Eyes:      Extraocular Movements: Extraocular movements intact.      Conjunctiva/sclera: Conjunctivae normal.      Pupils: Pupils are equal, round, and reactive to light.   Pulmonary:      Effort: Pulmonary effort is normal. No respiratory distress.   Musculoskeletal:         General: No swelling, tenderness or deformity. Normal range of motion.      Cervical back: Normal range of motion and neck supple.      Right lower leg: No edema.      Left lower leg: No edema.      Comments: R palm with Dupuytren's contracture isolated to thumb/index  No synovitis noted on exam   Skin:     Findings: No bruising or rash.   Neurological:      General: No focal deficit present.      Mental Status: He is alert and oriented to person, place, and time. Mental status is at baseline.      Gait: Gait normal.   Psychiatric:         Mood and Affect: Mood normal.       Health Maintenance Due   Topic Date Due   • Yearly Adult Physical  Never done   • Diabetes: Retinopathy Screening  Never done   • Diabetes: Urine Protein Screening  Never done   • Zoster Vaccines (1 of 2) Never done   • Diabetes: Hemoglobin A1C  09/17/2022   • RSV Pregnant patients and/or  patients aged 60+ years (1 - 1-dose 60+ series) Never done   • Lipid Panel  08/26/2024   • Influenza Vaccine (1) Never done       Assessment/plan  Assessment & Plan  Generalized osteoarthritis  Will change NSAID to see if he has improved relief of symptoms  Continue to see pain management regarding back pain  Orders:  •  Comprehensive Metabolic Panel; Future  •  diclofenac (Voltaren) 75 mg EC tablet; Take 1 tablet (75 mg) by mouth 2 times a day. Do not crush, chew, or split.    NSAID long-term  use    Orders:  •  Comprehensive Metabolic Panel; Future    Vitamin D deficiency  Labs ordered  Orders:  •  Vitamin D 25-Hydroxy,Total (for eval of Vitamin D levels); Future      Follow up: ___6__months (of note, current insurance is [employer] based and this visit is out of network.  Pt to decide if he will continue to see me as self pay)    Immunization History   Administered Date(s) Administered   • Tdap vaccine, age 7 year and older (BOOSTRIX, ADACEL) 01/01/2016

## 2025-04-11 ENCOUNTER — APPOINTMENT (OUTPATIENT)
Dept: RHEUMATOLOGY | Facility: CLINIC | Age: 60
End: 2025-04-11

## 2025-05-15 ENCOUNTER — OFFICE (OUTPATIENT)
Dept: URBAN - METROPOLITAN AREA CLINIC 26 | Facility: CLINIC | Age: 60
End: 2025-05-15
Payer: MEDICAID

## 2025-05-15 VITALS
WEIGHT: 228 LBS | HEART RATE: 71 BPM | SYSTOLIC BLOOD PRESSURE: 175 MMHG | DIASTOLIC BLOOD PRESSURE: 87 MMHG | TEMPERATURE: 98.2 F | DIASTOLIC BLOOD PRESSURE: 89 MMHG | SYSTOLIC BLOOD PRESSURE: 178 MMHG | HEIGHT: 70 IN

## 2025-05-15 DIAGNOSIS — R11.0 NAUSEA: ICD-10-CM

## 2025-05-15 DIAGNOSIS — K22.70 BARRETT'S ESOPHAGUS WITHOUT DYSPLASIA: ICD-10-CM

## 2025-05-15 DIAGNOSIS — K86.89 OTHER SPECIFIED DISEASES OF PANCREAS: ICD-10-CM

## 2025-05-15 DIAGNOSIS — I82.890 ACUTE EMBOLISM AND THROMBOSIS OF OTHER SPECIFIED VEINS: ICD-10-CM

## 2025-05-15 DIAGNOSIS — D64.9 ANEMIA, UNSPECIFIED: ICD-10-CM

## 2025-05-15 DIAGNOSIS — R14.2 ERUCTATION: ICD-10-CM

## 2025-05-15 DIAGNOSIS — I86.4 GASTRIC VARICES: ICD-10-CM

## 2025-05-15 DIAGNOSIS — K59.00 CONSTIPATION, UNSPECIFIED: ICD-10-CM

## 2025-05-15 DIAGNOSIS — K21.9 GASTRO-ESOPHAGEAL REFLUX DISEASE WITHOUT ESOPHAGITIS: ICD-10-CM

## 2025-05-15 DIAGNOSIS — K82.4 CHOLESTEROLOSIS OF GALLBLADDER: ICD-10-CM

## 2025-05-15 DIAGNOSIS — Z86.0101 PERSONAL HISTORY OF ADENOMATOUS AND SERRATED COLON POLYPS: ICD-10-CM

## 2025-05-15 PROCEDURE — 99214 OFFICE O/P EST MOD 30 MIN: CPT | Performed by: NURSE PRACTITIONER

## 2025-05-15 RX ORDER — FAMOTIDINE 40 MG/1
40 TABLET, FILM COATED ORAL
Qty: 30 | Refills: 5 | Status: ACTIVE
Start: 2025-05-15

## 2025-05-15 RX ORDER — OMEPRAZOLE 40 MG/1
40 CAPSULE, DELAYED RELEASE ORAL
Qty: 30 | Refills: 11 | Status: ACTIVE

## 2025-05-15 NOTE — SERVICEROSSYSTEMNOTES
Pt has been having nausea in the mornings when he wakes up, and has been having sulfa tasting burps.

## 2025-06-06 ENCOUNTER — APPOINTMENT (OUTPATIENT)
Dept: RHEUMATOLOGY | Facility: CLINIC | Age: 60
End: 2025-06-06

## 2025-06-26 ENCOUNTER — AMBULATORY SURGICAL CENTER (OUTPATIENT)
Dept: URBAN - METROPOLITAN AREA SURGERY 12 | Facility: SURGERY | Age: 60
End: 2025-06-26
Payer: MEDICARE

## 2025-06-26 ENCOUNTER — OFFICE (OUTPATIENT)
Dept: URBAN - METROPOLITAN AREA PATHOLOGY 2 | Facility: PATHOLOGY | Age: 60
End: 2025-06-26
Payer: MEDICARE

## 2025-06-26 VITALS
RESPIRATION RATE: 13 BRPM | SYSTOLIC BLOOD PRESSURE: 138 MMHG | RESPIRATION RATE: 8 BRPM | SYSTOLIC BLOOD PRESSURE: 123 MMHG | DIASTOLIC BLOOD PRESSURE: 85 MMHG | SYSTOLIC BLOOD PRESSURE: 138 MMHG | OXYGEN SATURATION: 96 % | DIASTOLIC BLOOD PRESSURE: 83 MMHG | OXYGEN SATURATION: 99 % | SYSTOLIC BLOOD PRESSURE: 131 MMHG | HEIGHT: 70 IN | DIASTOLIC BLOOD PRESSURE: 88 MMHG | HEART RATE: 66 BPM | SYSTOLIC BLOOD PRESSURE: 123 MMHG | HEART RATE: 66 BPM | HEART RATE: 65 BPM | DIASTOLIC BLOOD PRESSURE: 83 MMHG | HEART RATE: 73 BPM | DIASTOLIC BLOOD PRESSURE: 100 MMHG | HEART RATE: 71 BPM | SYSTOLIC BLOOD PRESSURE: 145 MMHG | RESPIRATION RATE: 12 BRPM | HEART RATE: 65 BPM | HEART RATE: 74 BPM | HEART RATE: 72 BPM | SYSTOLIC BLOOD PRESSURE: 145 MMHG | DIASTOLIC BLOOD PRESSURE: 90 MMHG | OXYGEN SATURATION: 98 % | HEART RATE: 72 BPM | DIASTOLIC BLOOD PRESSURE: 82 MMHG | RESPIRATION RATE: 9 BRPM | RESPIRATION RATE: 14 BRPM | OXYGEN SATURATION: 98 % | DIASTOLIC BLOOD PRESSURE: 85 MMHG | DIASTOLIC BLOOD PRESSURE: 85 MMHG | DIASTOLIC BLOOD PRESSURE: 82 MMHG | TEMPERATURE: 98.6 F | RESPIRATION RATE: 9 BRPM | DIASTOLIC BLOOD PRESSURE: 80 MMHG | RESPIRATION RATE: 9 BRPM | DIASTOLIC BLOOD PRESSURE: 89 MMHG | DIASTOLIC BLOOD PRESSURE: 100 MMHG | DIASTOLIC BLOOD PRESSURE: 88 MMHG | SYSTOLIC BLOOD PRESSURE: 123 MMHG | SYSTOLIC BLOOD PRESSURE: 167 MMHG | RESPIRATION RATE: 8 BRPM | SYSTOLIC BLOOD PRESSURE: 133 MMHG | HEART RATE: 71 BPM | RESPIRATION RATE: 13 BRPM | HEART RATE: 74 BPM | RESPIRATION RATE: 8 BRPM | SYSTOLIC BLOOD PRESSURE: 131 MMHG | RESPIRATION RATE: 14 BRPM | OXYGEN SATURATION: 99 % | HEART RATE: 71 BPM | TEMPERATURE: 98.6 F | OXYGEN SATURATION: 99 % | RESPIRATION RATE: 16 BRPM | DIASTOLIC BLOOD PRESSURE: 80 MMHG | SYSTOLIC BLOOD PRESSURE: 145 MMHG | DIASTOLIC BLOOD PRESSURE: 90 MMHG | DIASTOLIC BLOOD PRESSURE: 90 MMHG | SYSTOLIC BLOOD PRESSURE: 136 MMHG | RESPIRATION RATE: 12 BRPM | OXYGEN SATURATION: 96 % | WEIGHT: 226 LBS | DIASTOLIC BLOOD PRESSURE: 88 MMHG | SYSTOLIC BLOOD PRESSURE: 138 MMHG | RESPIRATION RATE: 16 BRPM | HEART RATE: 73 BPM | HEART RATE: 74 BPM | HEART RATE: 73 BPM | HEART RATE: 67 BPM | WEIGHT: 226 LBS | DIASTOLIC BLOOD PRESSURE: 89 MMHG | SYSTOLIC BLOOD PRESSURE: 131 MMHG | SYSTOLIC BLOOD PRESSURE: 136 MMHG | DIASTOLIC BLOOD PRESSURE: 89 MMHG | SYSTOLIC BLOOD PRESSURE: 133 MMHG | RESPIRATION RATE: 12 BRPM | DIASTOLIC BLOOD PRESSURE: 83 MMHG | HEART RATE: 66 BPM | DIASTOLIC BLOOD PRESSURE: 80 MMHG | RESPIRATION RATE: 16 BRPM | SYSTOLIC BLOOD PRESSURE: 136 MMHG | OXYGEN SATURATION: 97 % | DIASTOLIC BLOOD PRESSURE: 82 MMHG | OXYGEN SATURATION: 97 % | RESPIRATION RATE: 14 BRPM | OXYGEN SATURATION: 97 % | OXYGEN SATURATION: 98 % | HEIGHT: 70 IN | HEART RATE: 65 BPM | OXYGEN SATURATION: 96 % | HEART RATE: 67 BPM | HEART RATE: 67 BPM | WEIGHT: 226 LBS | DIASTOLIC BLOOD PRESSURE: 100 MMHG | HEART RATE: 72 BPM | RESPIRATION RATE: 13 BRPM | SYSTOLIC BLOOD PRESSURE: 167 MMHG | HEIGHT: 70 IN | SYSTOLIC BLOOD PRESSURE: 167 MMHG | TEMPERATURE: 98.6 F | SYSTOLIC BLOOD PRESSURE: 133 MMHG

## 2025-06-26 DIAGNOSIS — K21.00 GASTRO-ESOPHAGEAL REFLUX DISEASE WITH ESOPHAGITIS, WITHOUT B: ICD-10-CM

## 2025-06-26 DIAGNOSIS — K21.9 GASTRO-ESOPHAGEAL REFLUX DISEASE WITHOUT ESOPHAGITIS: ICD-10-CM

## 2025-06-26 DIAGNOSIS — K31.89 OTHER DISEASES OF STOMACH AND DUODENUM: ICD-10-CM

## 2025-06-26 DIAGNOSIS — R11.2 NAUSEA WITH VOMITING, UNSPECIFIED: ICD-10-CM

## 2025-06-26 DIAGNOSIS — R93.5 ABNORMAL FINDINGS ON DIAGNOSTIC IMAGING OF OTHER ABDOMINAL R: ICD-10-CM

## 2025-06-26 DIAGNOSIS — K44.9 DIAPHRAGMATIC HERNIA WITHOUT OBSTRUCTION OR GANGRENE: ICD-10-CM

## 2025-06-26 DIAGNOSIS — T18.2XXA FOREIGN BODY IN STOMACH, INITIAL ENCOUNTER: ICD-10-CM

## 2025-06-26 DIAGNOSIS — K22.89 OTHER SPECIFIED DISEASE OF ESOPHAGUS: ICD-10-CM

## 2025-06-26 PROCEDURE — 88342 IMHCHEM/IMCYTCHM 1ST ANTB: CPT | Performed by: PATHOLOGY

## 2025-06-26 PROCEDURE — 43239 EGD BIOPSY SINGLE/MULTIPLE: CPT | Performed by: INTERNAL MEDICINE

## 2025-06-26 PROCEDURE — 88305 TISSUE EXAM BY PATHOLOGIST: CPT | Performed by: PATHOLOGY

## 2025-06-26 PROCEDURE — 88313 SPECIAL STAINS GROUP 2: CPT | Performed by: PATHOLOGY

## 2025-07-28 ENCOUNTER — APPOINTMENT (OUTPATIENT)
Dept: RHEUMATOLOGY | Facility: CLINIC | Age: 60
End: 2025-07-28